# Patient Record
Sex: MALE | Race: OTHER | HISPANIC OR LATINO | URBAN - METROPOLITAN AREA
[De-identification: names, ages, dates, MRNs, and addresses within clinical notes are randomized per-mention and may not be internally consistent; named-entity substitution may affect disease eponyms.]

---

## 2023-03-15 ENCOUNTER — OUTPATIENT (OUTPATIENT)
Dept: OUTPATIENT SERVICES | Facility: HOSPITAL | Age: 53
LOS: 1 days | End: 2023-03-15
Payer: MEDICAID

## 2023-03-15 VITALS
RESPIRATION RATE: 17 BRPM | HEIGHT: 67 IN | WEIGHT: 235.01 LBS | DIASTOLIC BLOOD PRESSURE: 77 MMHG | SYSTOLIC BLOOD PRESSURE: 134 MMHG | HEART RATE: 73 BPM | TEMPERATURE: 99 F | OXYGEN SATURATION: 99 %

## 2023-03-15 DIAGNOSIS — M19.072 PRIMARY OSTEOARTHRITIS, LEFT ANKLE AND FOOT: ICD-10-CM

## 2023-03-15 DIAGNOSIS — M20.12 HALLUX VALGUS (ACQUIRED), LEFT FOOT: ICD-10-CM

## 2023-03-15 DIAGNOSIS — Z01.818 ENCOUNTER FOR OTHER PREPROCEDURAL EXAMINATION: ICD-10-CM

## 2023-03-15 DIAGNOSIS — I10 ESSENTIAL (PRIMARY) HYPERTENSION: ICD-10-CM

## 2023-03-15 DIAGNOSIS — E66.9 OBESITY, UNSPECIFIED: ICD-10-CM

## 2023-03-15 DIAGNOSIS — M21.612 BUNION OF LEFT FOOT: ICD-10-CM

## 2023-03-15 DIAGNOSIS — Z98.890 OTHER SPECIFIED POSTPROCEDURAL STATES: Chronic | ICD-10-CM

## 2023-03-15 DIAGNOSIS — E11.9 TYPE 2 DIABETES MELLITUS WITHOUT COMPLICATIONS: ICD-10-CM

## 2023-03-15 DIAGNOSIS — E78.5 HYPERLIPIDEMIA, UNSPECIFIED: ICD-10-CM

## 2023-03-15 PROCEDURE — G0463: CPT

## 2023-03-15 RX ORDER — LOSARTAN POTASSIUM 100 MG/1
1 TABLET, FILM COATED ORAL
Qty: 0 | Refills: 0 | DISCHARGE

## 2023-03-15 RX ORDER — SODIUM CHLORIDE 9 MG/ML
3 INJECTION INTRAMUSCULAR; INTRAVENOUS; SUBCUTANEOUS EVERY 8 HOURS
Refills: 0 | Status: DISCONTINUED | OUTPATIENT
Start: 2023-03-20 | End: 2023-04-03

## 2023-03-15 NOTE — H&P PST ADULT - NSANTHOSAYNRD_GEN_A_CORE
No. ARIANE screening performed.  STOP BANG Legend: 0-2 = LOW Risk; 3-4 = INTERMEDIATE Risk; 5-8 = HIGH Risk

## 2023-03-15 NOTE — H&P PST ADULT - MUSCULOSKELETAL COMMENTS
standing discomfort left foot bunion standing discomfort left foot bunion hallus valgus acquired left foot left foot hallux valgus acquired left foot bunion left foot

## 2023-03-15 NOTE — H&P PST ADULT - NSICDXFAMILYHX_GEN_ALL_CORE_FT
FAMILY HISTORY:  Father  Still living? No  Family history of diabetes mellitus (DM), Age at diagnosis: Age Unknown    Mother  Still living? Yes, Estimated age: 74  Family history of diabetes mellitus (DM), Age at diagnosis: Age Unknown

## 2023-03-15 NOTE — H&P PST ADULT - TOBACCO USE
Over the last 2 weeks, how often have you been bothered by the following problems? PHQ2 Score:  2  1. Little interest or pleasure in doing things?:  Several days  2. Feeling down, depressed, or hopeless?:  Several days     PHQ9 Score:  3  3. Trouble falling, staying asleep or sleeping too much?:  Not at all  4. Feeling tired or having little energy?:  Not at all  5. Poor appetite or overeating?:  Not at all  6. Feeling bad about yourself - or that you are a failure or that you have let yourself or your family down?:  Several days  7. Trouble concentrating on things such as reading a newspaper or watching television?:  Not at all  8. Moving or speaking so slowly that other people could have noticed? Or the opposite - being so fidgety or restless that you were moving around a lot more than usual?:  Not at all  9.  Thoughts that you would be better off dead, or of hurting yourself in some way?:  Not at all Never smoker

## 2023-03-15 NOTE — H&P PST ADULT - ASSESSMENT
53 yr old male with history diabetes, (A1C 8.0) HTN, HDL, obese, presents with hallus valgus acquired left foot, bunion of left foot , primary osteoarthritis left ankle and foot. Pt stated while working and standing for a prolong time his left foot causes discomfort 5/10 Pt would relieve the pain by sitting and elevate. Pt had consult with  and scheduled for surgery left foot fusion of 1st metatarsophalangeal joint on 3/20/2023. Pt had clearance and blood work EKG done.

## 2023-03-15 NOTE — H&P PST ADULT - NSICDXPASTMEDICALHX_GEN_ALL_CORE_FT
PAST MEDICAL HISTORY:  Bunion of left foot     DM (diabetes mellitus)     Hallux valgus (acquired), left foot     HLD (hyperlipidemia)     HTN (hypertension)     Primary osteoarthritis, left ankle and foot      PAST MEDICAL HISTORY:  Bunion of left foot     DM (diabetes mellitus)     Hallux valgus (acquired), left foot     HLD (hyperlipidemia)     HTN (hypertension)     Obese     Primary osteoarthritis, left ankle and foot

## 2023-03-15 NOTE — H&P PST ADULT - HISTORY OF PRESENT ILLNESS
53 yr old male with history diabetes, HTN, HDL, obese, presents with hallus valgus acquired left foot, bunion of left foot , primary osteoarthritis left ankle and foot.  53 yr old male with history diabetes, (A1C 8.0) HTN, HDL, obese, presents with hallus valgus acquired left foot, bunion of left foot , primary osteoarthritis left ankle and foot. Pt stated while working and standing for a prolong time his left foot causes discomfort 5/10 Pt would relieve the pain by sitting and elevate. Pt had consult with  and scheduled for surgery left foot fusion of 1st metatarsophalangeal joint on 3/20/2023. Pt had clearance and blood work EKG done.

## 2023-03-15 NOTE — H&P PST ADULT - PROBLEM SELECTOR PLAN 5
schedule for left foot fusion of 1st metatarsophalangeal joint .    Pt instructed to be NPO the night before and the morning of surgery except for BP med with sip of water. Provided with chlorhexidene 4% solution to wash for 3 days including the morning of surgery. Written directions given to pt and verbalized understanding. Escort required post procedure Tylenol to be used prior to surgery.     Stop Bang Score=4 Pt is intermediate risk for ARIANE Pt denies any Sleep Apnea. Pt to maintain safe airway.

## 2023-03-15 NOTE — H&P PST ADULT - PROBLEM SELECTOR PLAN 3
Pt instructed NPO to take any or Pt instructed not to take oral glycemic agents but will be monitor blood glucose pre and post procedure. A1C 8.0 pt aware of level. Pt to F/U with PCP

## 2023-03-20 ENCOUNTER — OUTPATIENT (OUTPATIENT)
Dept: OUTPATIENT SERVICES | Facility: HOSPITAL | Age: 53
LOS: 1 days | End: 2023-03-20
Payer: MEDICAID

## 2023-03-20 VITALS
RESPIRATION RATE: 17 BRPM | OXYGEN SATURATION: 99 % | TEMPERATURE: 99 F | DIASTOLIC BLOOD PRESSURE: 78 MMHG | WEIGHT: 235.01 LBS | SYSTOLIC BLOOD PRESSURE: 134 MMHG | HEIGHT: 67 IN | HEART RATE: 73 BPM

## 2023-03-20 VITALS
DIASTOLIC BLOOD PRESSURE: 69 MMHG | RESPIRATION RATE: 15 BRPM | SYSTOLIC BLOOD PRESSURE: 106 MMHG | HEART RATE: 58 BPM | TEMPERATURE: 98 F | OXYGEN SATURATION: 96 %

## 2023-03-20 DIAGNOSIS — M19.072 PRIMARY OSTEOARTHRITIS, LEFT ANKLE AND FOOT: ICD-10-CM

## 2023-03-20 DIAGNOSIS — M21.612 BUNION OF LEFT FOOT: ICD-10-CM

## 2023-03-20 DIAGNOSIS — Z98.890 OTHER SPECIFIED POSTPROCEDURAL STATES: Chronic | ICD-10-CM

## 2023-03-20 DIAGNOSIS — M20.12 HALLUX VALGUS (ACQUIRED), LEFT FOOT: ICD-10-CM

## 2023-03-20 DIAGNOSIS — Z01.818 ENCOUNTER FOR OTHER PREPROCEDURAL EXAMINATION: ICD-10-CM

## 2023-03-20 LAB
GLUCOSE BLDC GLUCOMTR-MCNC: 114 MG/DL — HIGH (ref 70–99)
GLUCOSE BLDC GLUCOMTR-MCNC: 156 MG/DL — HIGH (ref 70–99)

## 2023-03-20 PROCEDURE — 88311 DECALCIFY TISSUE: CPT

## 2023-03-20 PROCEDURE — C1713: CPT

## 2023-03-20 PROCEDURE — 88304 TISSUE EXAM BY PATHOLOGIST: CPT

## 2023-03-20 PROCEDURE — 82962 GLUCOSE BLOOD TEST: CPT

## 2023-03-20 PROCEDURE — 88304 TISSUE EXAM BY PATHOLOGIST: CPT | Mod: 26

## 2023-03-20 PROCEDURE — 28750 FUSION OF BIG TOE JOINT: CPT | Mod: LT

## 2023-03-20 PROCEDURE — 73630 X-RAY EXAM OF FOOT: CPT

## 2023-03-20 PROCEDURE — 73630 X-RAY EXAM OF FOOT: CPT | Mod: 26,LT

## 2023-03-20 PROCEDURE — 88311 DECALCIFY TISSUE: CPT | Mod: 26

## 2023-03-20 DEVICE — IMPLANTABLE DEVICE: Type: IMPLANTABLE DEVICE | Site: LEFT FOOT FUSION OF 1ST METATARSOPHALANGEAL JOINT | Status: FUNCTIONAL

## 2023-03-20 RX ORDER — FENTANYL CITRATE 50 UG/ML
25 INJECTION INTRAVENOUS
Refills: 0 | Status: DISCONTINUED | OUTPATIENT
Start: 2023-03-20 | End: 2023-03-20

## 2023-03-20 RX ORDER — INSULIN GLARGINE 100 [IU]/ML
14 INJECTION, SOLUTION SUBCUTANEOUS
Qty: 0 | Refills: 0 | DISCHARGE

## 2023-03-20 RX ORDER — FENTANYL CITRATE 50 UG/ML
50 INJECTION INTRAVENOUS
Refills: 0 | Status: DISCONTINUED | OUTPATIENT
Start: 2023-03-20 | End: 2023-03-20

## 2023-03-20 RX ORDER — ACETAMINOPHEN 500 MG
975 TABLET ORAL ONCE
Refills: 0 | Status: COMPLETED | OUTPATIENT
Start: 2023-03-20 | End: 2023-03-20

## 2023-03-20 RX ORDER — METFORMIN HYDROCHLORIDE 850 MG/1
1 TABLET ORAL
Qty: 0 | Refills: 0 | DISCHARGE

## 2023-03-20 RX ORDER — CELECOXIB 200 MG/1
200 CAPSULE ORAL ONCE
Refills: 0 | Status: COMPLETED | OUTPATIENT
Start: 2023-03-20 | End: 2023-03-20

## 2023-03-20 RX ORDER — ALOGLIPTIN 12.5 MG/1
1 TABLET, FILM COATED ORAL
Qty: 0 | Refills: 0 | DISCHARGE

## 2023-03-20 RX ORDER — ATORVASTATIN CALCIUM 80 MG/1
1 TABLET, FILM COATED ORAL
Qty: 0 | Refills: 0 | DISCHARGE

## 2023-03-20 RX ORDER — LOSARTAN/HYDROCHLOROTHIAZIDE 100MG-25MG
1 TABLET ORAL
Qty: 0 | Refills: 0 | DISCHARGE

## 2023-03-20 RX ADMIN — Medication 975 MILLIGRAM(S): at 11:38

## 2023-03-20 RX ADMIN — SODIUM CHLORIDE 3 MILLILITER(S): 9 INJECTION INTRAMUSCULAR; INTRAVENOUS; SUBCUTANEOUS at 11:20

## 2023-03-20 RX ADMIN — CELECOXIB 200 MILLIGRAM(S): 200 CAPSULE ORAL at 11:39

## 2023-03-20 NOTE — ASU PATIENT PROFILE, ADULT - NSICDXPASTMEDICALHX_GEN_ALL_CORE_FT
PAST MEDICAL HISTORY:  Bunion of left foot     DM (diabetes mellitus)     Hallux valgus (acquired), left foot     HLD (hyperlipidemia)     HTN (hypertension)     Obese     Primary osteoarthritis, left ankle and foot

## 2023-03-20 NOTE — BRIEF OPERATIVE NOTE - NSICDXBRIEFPROCEDURE_GEN_ALL_CORE_FT
PROCEDURES:  Fusion of metatarsophalangeal (MTP) joint 20-Mar-2023 17:28:52 Left 1st MPJ Zaynab Christie

## 2023-03-20 NOTE — ASU DISCHARGE PLAN (ADULT/PEDIATRIC) - ASU DC SPECIAL INSTRUCTIONSFT
Pt s/p L foot 1st MPJ fusion using IO Freedom Fixation [place]. Pt to be wb as tolerated. Pt to follow up with Dr. Quintana in one week.

## 2023-03-20 NOTE — ASU PATIENT PROFILE, ADULT - FALL HARM RISK - UNIVERSAL INTERVENTIONS
Bed in lowest position, wheels locked, appropriate side rails in place/Call bell, personal items and telephone in reach/Instruct patient to call for assistance before getting out of bed or chair/Non-slip footwear when patient is out of bed/[place] to call system/Physically safe environment - no spills, clutter or unnecessary equipment/Purposeful Proactive Rounding/Room/bathroom lighting operational, light cord in reach

## 2023-03-20 NOTE — ASU DISCHARGE PLAN (ADULT/PEDIATRIC) - CARE PROVIDER_API CALL
Judah Quintana  PODIATRIC MEDICINE AND SURGERY  [address], [facility] [place] [address]  Phone: [phone]  Fax: [phone]  Follow Up Time: 1 week

## 2023-03-20 NOTE — ASU DISCHARGE PLAN (ADULT/PEDIATRIC) - NS MD DC FALL RISK RISK
For information on Fall & Injury Prevention, visit: [email or web address] OR  [email or web address] OR  https://www.cdc.gov/steadi/patient.html

## 2023-03-27 LAB — SURGICAL PATHOLOGY STUDY: SIGNIFICANT CHANGE UP

## 2024-06-10 NOTE — ASU DISCHARGE PLAN (ADULT/PEDIATRIC) - CALL YOUR DOCTOR IF YOU HAVE ANY OF THE FOLLOWING:
Bleeding that does not stop/Swelling that gets worse/Pain not relieved by Medications/Fever greater than (need to indicate Fahrenheit or Celsius)/Wound/Surgical Site with redness, or foul smelling discharge or pus/Nausea and vomiting that does not stop/Unable to urinate (E4) spontaneous

## 2025-05-27 NOTE — ASU PATIENT PROFILE, ADULT - NS PRO ABUSE SCREEN AFRAID ANYONE YN
Rina Reddy (:  1996) is a Established patient, presenting virtually for evaluation of the following:    Assessment & Plan   Below is the assessment and plan developed based on review of pertinent history, physical exam, labs, studies, and medications.  Assessment & Plan  Class 3 severe obesity with serious comorbidity and body mass index (BMI) of 40.0 to 44.9 in adult, unspecified obesity type (HCC)    Will try the adipex capsule to see if that helps her not be as constipated- make sure to drink plenty of water, and get exercise    Orders:    phentermine (ADIPEX-P) 37.5 MG capsule; Take 1 capsule by mouth every morning for 30 days. Max Daily Amount: 37.5 mg    Generalized abdominal pain    Discussed sometimes weight loss can affect the gallbladder- will check with US    Orders:    US GALLBLADDER RUQ; Future    Nausea       Orders:    US GALLBLADDER RUQ; Future    ondansetron (ZOFRAN-ODT) 4 MG disintegrating tablet; Take 1 tablet by mouth 3 times daily as needed (migraine with nausea)    promethazine (PHENERGAN) 12.5 MG tablet; Take 1 tablet by mouth 3 times daily as needed for Nausea       No follow-ups on file.       Subjective   HPI  Wants to discuss her stomach pain and nausea: she states in March she was close to 230lbs. Now 210 lbs- eating off and on, eating small amounts, some days wasn't eating. Now has been having stomach pain this last week, as well as nausea. Pain is in the upper stomach- sometimes to the left- sometimes all over. Has taken adipex a couple times in the last few weeks. She states the tablet causes her to be really thirsty and constipated. Has been having vomiting-but nothing comes out- just dry heaving, and nausea. Has been using zofran. Has tried to increase her eating over the last week. Constipation off and on- sometimes diarrhea.       Review of Systems   Constitutional:  Negative for fever.   Gastrointestinal:  Positive for abdominal pain, constipation, diarrhea, nausea and 
no

## (undated) DEVICE — SYR LUER LOK 10CC

## (undated) DEVICE — SUT VICRYL 3-0 27" SH UNDYED

## (undated) DEVICE — LABELS BLANK W PEN

## (undated) DEVICE — WARMING BLANKET UPPER ADULT

## (undated) DEVICE — DRSG KLING 4"

## (undated) DEVICE — BUR STRYKER OVAL CARBIDE 4MM

## (undated) DEVICE — SAW BLADE MICROAIRE SAGITTAL 9.4MMX25.4MMX0.6MM

## (undated) DEVICE — PACK MINOR NO DRAPE

## (undated) DEVICE — GLV 8 PROTEXIS (WHITE)

## (undated) DEVICE — SUT VICRYL 2-0 27" PS-2 UNDYED

## (undated) DEVICE — NDL HYPO REGULAR BEVEL 25G X 1.5" (BLUE)

## (undated) DEVICE — FOR-TOURNIQUET 1130808363: Type: DURABLE MEDICAL EQUIPMENT

## (undated) DEVICE — Device

## (undated) DEVICE — SAW BLADE STRYKER SAGITTAL MICRO 9.5MM

## (undated) DEVICE — DRSG CURITY GAUZE SPONGE 4 X 4" 12-PLY

## (undated) DEVICE — DRSG ADAPTIC 3 X 8"

## (undated) DEVICE — TOURNIQUET ESMARK 4"

## (undated) DEVICE — DRSG STOCKINETTE TUBULAR 6"

## (undated) DEVICE — PREP CHLORAPREP HI-LITE ORANGE 26ML

## (undated) DEVICE — GLV 8.5 PROTEXIS (WHITE)

## (undated) DEVICE — BUR STRYKER EGG 4X44.5MM

## (undated) DEVICE — CANISTER DISPOSABLE THIN WALL 3000CC

## (undated) DEVICE — DRSG COBAN 4"

## (undated) DEVICE — VENODYNE/SCD SLEEVE CALF MEDIUM

## (undated) DEVICE — TOURNIQUET CUFF 18" DUAL PORT SINGLE BLADDER LUER LOCK (BLACK)

## (undated) DEVICE — POSITIONER STRAP ARMBOARD VELCRO TS-30

## (undated) DEVICE — FRAZIER SUCTION TIP 8FR

## (undated) DEVICE — SUT ETHILON 4-0 18" PS-2

## (undated) DEVICE — SOL IRR POUR NS 0.9% 500ML

## (undated) DEVICE — ELCTR GROUNDING PAD ADULT COVIDIEN

## (undated) DEVICE — NDL HYPO SAFE 18G X 1.5" (PINK)

## (undated) DEVICE — DRAPE C ARM MINI

## (undated) DEVICE — BLADE SURGICAL #15 CARBON